# Patient Record
Sex: FEMALE | Race: WHITE | NOT HISPANIC OR LATINO
[De-identification: names, ages, dates, MRNs, and addresses within clinical notes are randomized per-mention and may not be internally consistent; named-entity substitution may affect disease eponyms.]

---

## 2017-08-04 PROBLEM — Z00.00 ENCOUNTER FOR PREVENTIVE HEALTH EXAMINATION: Status: ACTIVE | Noted: 2017-08-04

## 2017-09-15 ENCOUNTER — APPOINTMENT (OUTPATIENT)
Dept: OPHTHALMOLOGY | Facility: CLINIC | Age: 77
End: 2017-09-15

## 2020-10-19 ENCOUNTER — APPOINTMENT (OUTPATIENT)
Dept: UROGYNECOLOGY | Facility: CLINIC | Age: 80
End: 2020-10-19
Payer: COMMERCIAL

## 2020-10-19 VITALS
HEART RATE: 70 BPM | TEMPERATURE: 97.5 F | DIASTOLIC BLOOD PRESSURE: 80 MMHG | SYSTOLIC BLOOD PRESSURE: 183 MMHG | BODY MASS INDEX: 22.02 KG/M2 | HEIGHT: 64 IN | WEIGHT: 129 LBS | OXYGEN SATURATION: 95 %

## 2020-10-19 DIAGNOSIS — Z86.59 PERSONAL HISTORY OF OTHER MENTAL AND BEHAVIORAL DISORDERS: ICD-10-CM

## 2020-10-19 DIAGNOSIS — Z85.3 PERSONAL HISTORY OF MALIGNANT NEOPLASM OF BREAST: ICD-10-CM

## 2020-10-19 DIAGNOSIS — Z79.899 OTHER LONG TERM (CURRENT) DRUG THERAPY: ICD-10-CM

## 2020-10-19 DIAGNOSIS — Z87.828 PERSONAL HISTORY OF OTHER (HEALED) PHYSICAL INJURY AND TRAUMA: ICD-10-CM

## 2020-10-19 DIAGNOSIS — R10.2 PELVIC AND PERINEAL PAIN: ICD-10-CM

## 2020-10-19 DIAGNOSIS — G89.29 PELVIC AND PERINEAL PAIN: ICD-10-CM

## 2020-10-19 DIAGNOSIS — Z82.49 FAMILY HISTORY OF ISCHEMIC HEART DISEASE AND OTHER DISEASES OF THE CIRCULATORY SYSTEM: ICD-10-CM

## 2020-10-19 DIAGNOSIS — I10 ESSENTIAL (PRIMARY) HYPERTENSION: ICD-10-CM

## 2020-10-19 DIAGNOSIS — Z87.19 PERSONAL HISTORY OF OTHER DISEASES OF THE DIGESTIVE SYSTEM: ICD-10-CM

## 2020-10-19 DIAGNOSIS — Z80.3 FAMILY HISTORY OF MALIGNANT NEOPLASM OF BREAST: ICD-10-CM

## 2020-10-19 DIAGNOSIS — E07.9 DISORDER OF THYROID, UNSPECIFIED: ICD-10-CM

## 2020-10-19 DIAGNOSIS — Z78.9 OTHER SPECIFIED HEALTH STATUS: ICD-10-CM

## 2020-10-19 PROCEDURE — 99203 OFFICE O/P NEW LOW 30 MIN: CPT

## 2020-10-19 RX ORDER — ATORVASTATIN CALCIUM 10 MG/1
10 TABLET, FILM COATED ORAL
Refills: 0 | Status: ACTIVE | COMMUNITY

## 2020-10-19 RX ORDER — LEVOTHYROXINE SODIUM 88 UG/1
88 TABLET ORAL
Refills: 0 | Status: ACTIVE | COMMUNITY

## 2020-10-19 RX ORDER — ESCITALOPRAM OXALATE 20 MG/1
20 TABLET ORAL
Refills: 0 | Status: ACTIVE | COMMUNITY

## 2020-10-19 RX ORDER — PSYLLIUM HUSK 0.4 G
CAPSULE ORAL
Refills: 0 | Status: ACTIVE | COMMUNITY

## 2020-10-19 RX ORDER — DILTIAZEM HYDROCHLORIDE 90 MG/1
TABLET ORAL
Refills: 0 | Status: ACTIVE | COMMUNITY

## 2020-10-19 RX ORDER — MELATONIN 3 MG
TABLET ORAL
Refills: 0 | Status: ACTIVE | COMMUNITY

## 2020-10-19 RX ORDER — CLONAZEPAM 2 MG/1
2 TABLET ORAL
Refills: 0 | Status: ACTIVE | COMMUNITY

## 2020-10-19 RX ORDER — MULTIVITAMIN
TABLET ORAL
Refills: 0 | Status: ACTIVE | COMMUNITY

## 2020-10-19 NOTE — PHYSICAL EXAM
[No Acute Distress] : in no acute distress [Chaperone Present] : A chaperone was present in the examining room during all aspects of the physical examination [Oriented x3] : oriented to person, place, and time [Well Nourished] : ~L well nourished [Well developed] : well developed [Normal Memory] : ~T memory was ~L unimpaired [Turgor Normal] : skin turgor ~T was normal [Warm and Dry] : was warm and dry to touch [No Lesions] : no lesions were seen on the external genitalia [Labia Minora] : were normal [Labia Majora] : were normal [No Bleeding] : there was no active vaginal bleeding [Uterine Adnexae] : were not tender and not enlarged [Normal Position] : in a normal position [Normal rectal exam] : was normal [Normal] : was normal [Normal Gait] : gait was normal [Respirations regular] : ~T respiratory rate was regular [No Edema] : ~T edema was not present [Normal Appearance] : ~T the appearance of the neck was normal [Mass (___ Cm)] : no ~M [unfilled] abdominal mass was palpated [Anxiety] : patient is not anxious [Tenderness] : ~T no ~M abdominal tenderness observed [H/Smegaly] : no hepatosplenomegaly [Distended] : not distended [Inguinal LAD] : no adenopathy was noted in the inguinal lymph nodes [FreeTextEntry4] : no pelvic floor tenderness, no levator ani muscle tenderness on palpation [de-identified] : no prolapse

## 2020-10-19 NOTE — HISTORY OF PRESENT ILLNESS
[Vaginal Wall Prolapse] : no [Uterine Prolapse] : no [Cystocele (Obstetric)] : no [Rectal Prolapse] : no [Unable To Restrain Bowel Movement] : no [Feelings Of Urinary Urgency] : no [Pain During Urination (Dysuria)] : no [Urinary Frequency More Than Twice At Night (Nocturia)] : no nocturia [Constipation Obstructed Defecation] : moderate [Urinary Tract Infection] : no [Diarrhea] : mild [Stool Visible Blood] : mild [] : years ago [Vaginal Pain] : moderate [de-identified] : voids 3x per day [FreeTextEntry6] : taking miralax and fiber supplement [de-identified] : not sexually active by choice [FreeTextEntry1] : Emy is a 80 presenting for 10 years of pelvic pain and constipation, with previous diagnosis of pelvic floor dysfunction.  She has had symptoms of daily lower abdominal pain and cramping for about 10 years.  Relief of symptoms with bowel movements.  Has seen multiple obgyns and gastroenterologists for her pain and had GI studies and imaging, most of previous care at [facility].  The gastroenterologists she has most recently seen is Dr Machado who did multiple imaging tests, all of which were normal per patient.  She was started her on metamucil and miralax with some improvement in her bowel function and bowel movements most days.   Sent for pelvic floor physical therapy in August at [facility], with some improvement in symptoms however did not keep up with exercises and symptoms returned.  Denies urinary leakage urgency and frequency.  \par \par PMH: Depression, HTN, breast cancer, chronic constipation, chronic pain on medical marijuana \par PSH: None\par SH: , nonsmoker, no alcohol use\par

## 2020-10-19 NOTE — REASON FOR VISIT
[Initial Visit ___] : an initial visit for [unfilled] [Intake Form Reviewed] : Patient intake form with past medical history, surgical history, family history and social history reviewed today [Questionnaire Received] : Patient questionnaire received

## 2020-10-19 NOTE — DISCUSSION/SUMMARY
[FreeTextEntry1] : Emy is a 80 presenting with chronic pelvic pain and constipation.  Normal pelvic exam with no evidence of pelvic floor dysfunction and no pelvic muscle tenderness.  Patient with chronic constipation, and relief of some symptoms with bowel movement, pelvic pain likely not gynecologic in nature.  Discussed normal findings with pelvic floor dysfunction, and lack of findings of spasm and no focal tenderness on examination. Additionally, no prolapse on examination.\par \par Recommended patient follow up with an gastroenterologist for further work up.  Additionally recommended patient look into Dhaval Delatorre, a neuro-surgeon at [facility], for management of her pain.  Patient can return to our care if further concerns or new gynecologic symptoms.  All questions answered.